# Patient Record
Sex: FEMALE | Race: WHITE | HISPANIC OR LATINO
[De-identification: names, ages, dates, MRNs, and addresses within clinical notes are randomized per-mention and may not be internally consistent; named-entity substitution may affect disease eponyms.]

---

## 2021-12-18 ENCOUNTER — FORM ENCOUNTER (OUTPATIENT)
Age: 61
End: 2021-12-18

## 2021-12-19 ENCOUNTER — FORM ENCOUNTER (OUTPATIENT)
Age: 61
End: 2021-12-19

## 2021-12-20 ENCOUNTER — FORM ENCOUNTER (OUTPATIENT)
Age: 61
End: 2021-12-20

## 2021-12-29 ENCOUNTER — FORM ENCOUNTER (OUTPATIENT)
Age: 61
End: 2021-12-29

## 2021-12-29 PROBLEM — Z00.00 ENCOUNTER FOR PREVENTIVE HEALTH EXAMINATION: Status: ACTIVE | Noted: 2021-12-29

## 2021-12-30 ENCOUNTER — FORM ENCOUNTER (OUTPATIENT)
Age: 61
End: 2021-12-30

## 2022-01-03 ENCOUNTER — NON-APPOINTMENT (OUTPATIENT)
Age: 62
End: 2022-01-03

## 2022-01-05 ENCOUNTER — APPOINTMENT (OUTPATIENT)
Dept: NEUROLOGY | Facility: CLINIC | Age: 62
End: 2022-01-05
Payer: COMMERCIAL

## 2022-01-05 ENCOUNTER — RESULT REVIEW (OUTPATIENT)
Age: 62
End: 2022-01-05

## 2022-01-05 PROCEDURE — 99204 OFFICE O/P NEW MOD 45 MIN: CPT | Mod: 95

## 2022-01-05 NOTE — PHYSICAL EXAM
[FreeTextEntry1] : The patient is alert and oriented x3, naming intact x3, repetition normal, follows three-step commands, and is able to participate fully in the history taking.\par Speech is normal with no evidence of dysarthria.\par \par Memory is intact: Immediate recall 3 out of 3, short-term 3 out of 3, remote memory intact\par \par Cranial nerves II through XII - appear grossly intact \par \par Motor exam: Upper and lower extremities grossly normal power, No abnormal movements noted.\par \par Coordination and vestibular exam: Finger to nose intact, no evidence of truncal or appendicular ataxia. No evidence of nystagmus. \par \par Gait: Normal stance and gait.\par \par Pulmonary: no respiratory distress, normal respiratory rhythm and effort and no accessory muscle use. \par \par Skin: normal skin color and pigmentation, no rash and no skin lesions.\par

## 2022-01-05 NOTE — REASON FOR VISIT
[Initial Evaluation] : an initial evaluation [Home] : at home, [unfilled] , at the time of the visit. [Other Location: e.g. Home (Enter Location, City,State)___] : at [unfilled] [Spouse] : spouse [Verbal consent obtained from patient] : the patient, [unfilled]

## 2022-01-05 NOTE — ASSESSMENT
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 MRI showed punctate hyperintensity in the R hypocampus which is classic of TGA, although small ischemic infarct can not be ruled out. Pt also found to have moderate stenosis of her L ICA on carotid US. Need to repeat MRI which should be done approx 4-6 weeks after initial MRI- this can help confirm etiology of this episode- hyperintensity should disappear on repeat imaging if this was indeed TGA. \par \par (There are few case reports of TGAs which occur as early manifestations of acute MI (pathophysiology thought to be either 1) myocardial ischemia-> leukocyte degranulation and plugging-> cerebral ischemia, vs 2) myocardial ischemia-> increased sympathetic activity -> upregulation of clotting factors inducing a thrombogenic state-> disruption of atherosclerotic plaque stability (including carotids)-> temporary cerebral ischemia)... ) Therefore addressing her CV risk factors is of utmost importance here. I stressed the necessity of close cardiology followup for monitoring of her cardiac status, and advised pt to again discuss cardiac angio with her cardiologist. Will also need to better evaluate plaque burden and stability in her carotids, as this is potentially r/t underlying pathophysiology of her episode. Need to also consider complex partial seizure, although given MRI finding my suspicion for this is low. . \par \par Plan:\par Repeat MRI head w/o\par MRA neck \par Agree with ASA and high dose statin. \par Routine EEG \par Advised pt to send in records from her hospitalization- discs and all paperwork, for review. \par \par Pt to f/u in office with attg, after repeat MRI. \par \par

## 2022-01-05 NOTE — HISTORY OF PRESENT ILLNESS
[FreeTextEntry1] : \par Discussed with patient the use of TEB and that while it can provide safe care in a remote setting, there are technical issues that do arise. I explained that this is a billable encounter. She understands that I cannot physically examine her, which would require an in-office visit. She consents to the use of TEB. She is accompanied today by her  who is helping to provide history. \par \par Pt is a 62yo F with PMH hypothyroidism, recent diagnosis of hypertension on losartan, B12 deficiency on monthly injections, here to establish neurological care after possible CVA vs TGA episode. On December 19th, she was in her usual state of health when she got into an argument with her . She went to separate room to calm herself and then  says she came back to him and was very confused- she was asking what day of the week it was, what year it is, etc. She saw picture of her daughter-in-law who is pregnant and she did not recall that she was pregnant. She kept saying "something feels wrong, I don't feel well." and she became emotional.  says she was aware of her surroundings, knew where to go find her coat., and fully recognized him. He says she remained foggy throughout the rest of the day but improved over the next 12 hours. Pt has very little memory of that day from start of her symptoms, and was totally back to baseline within 36 hours. Pt says that prior to this event, she was feeling chest pressures and "flutters." She continued to experience this while in the hospital- \par \par She went to ER at [facility]- CT head was negative for any acute infarct/ bleed. Her MRI showed punctate focus of restricted diffusion in the R hypocampus suggestive of TGA vs small acute infarct. Carotid ultrasound revealed LICA increased velocity d/t mo 50-69% stenosis. MRA head showed no branch occlusion or high grade stenosis. No MRA neck was obtained. Of note, she had elevated troponins in the ER, and they continued to rise slowly throughout her hospital stay. She had no EKG changes, and no arrhythmias were found on her cardiac monitor. She had TTE during her hospital stay which was normal except for mild mitral regurg. . She was d/c home on ASA 81mg daily, Atorvastatin 80mg QHS, as well as metoprolol 12.5mg QHS for cardioprotection. \par \par She says she has been doing well since d/c. She has had no new neurological events including any AMS, vision changes, focal/ unilateral weakness/ paresthesias/ imbalance/ dizziness, and has felt that she is back to her normal baseline. She is tolerating her medications well- ASA with no bleeding concerns. She has been taking her BPs and they are WNL. She is currently on a 30day cardiac monitor with her cardiologist, and does not believe any arrhythmias have been found thus far. She recently had stress test which was normal. She has been following closely with her cardiologist who is monitoring hger and is considering cardiac cath (but supposedly does not believe her trops were elevated enough to warrant one right now).

## 2022-01-16 ENCOUNTER — RESULT REVIEW (OUTPATIENT)
Age: 62
End: 2022-01-16

## 2022-01-16 ENCOUNTER — APPOINTMENT (OUTPATIENT)
Dept: MRI IMAGING | Facility: HOSPITAL | Age: 62
End: 2022-01-16

## 2022-01-16 ENCOUNTER — OUTPATIENT (OUTPATIENT)
Dept: OUTPATIENT SERVICES | Facility: HOSPITAL | Age: 62
LOS: 1 days | End: 2022-01-16
Payer: COMMERCIAL

## 2022-01-16 PROCEDURE — 70547 MR ANGIOGRAPHY NECK W/O DYE: CPT | Mod: 26

## 2022-01-16 PROCEDURE — 70547 MR ANGIOGRAPHY NECK W/O DYE: CPT

## 2022-01-16 PROCEDURE — 70551 MRI BRAIN STEM W/O DYE: CPT | Mod: 26

## 2022-01-16 PROCEDURE — 70551 MRI BRAIN STEM W/O DYE: CPT

## 2022-01-19 ENCOUNTER — NON-APPOINTMENT (OUTPATIENT)
Age: 62
End: 2022-01-19

## 2022-01-28 ENCOUNTER — APPOINTMENT (OUTPATIENT)
Dept: NEUROLOGY | Facility: CLINIC | Age: 62
End: 2022-01-28
Payer: COMMERCIAL

## 2022-01-28 VITALS
WEIGHT: 117 LBS | SYSTOLIC BLOOD PRESSURE: 142 MMHG | HEART RATE: 86 BPM | TEMPERATURE: 98.6 F | BODY MASS INDEX: 21.53 KG/M2 | HEIGHT: 62 IN | OXYGEN SATURATION: 99 % | DIASTOLIC BLOOD PRESSURE: 98 MMHG

## 2022-01-28 PROCEDURE — 99215 OFFICE O/P EST HI 40 MIN: CPT

## 2022-01-28 NOTE — PHYSICAL EXAM
[FreeTextEntry1] : Alert MMSE: 30/30.Speech and language are intact.  Cranial nerves II-XII are intact.  Motor exam reveals intact strength with individual muscle testing in bilateral upper and lower extremities.  Tone is normal.  Reflexes are normal.  Toes are downgoing.  Sensation is intact to light touch in distal extremities.  Finger-to-nose and heel-to-shin are intact.  Rapid alternating movements are normal in the upper and lower extremities.  Gait is normal. She is able to walk on heels, toes, and in tandem without difficulty. Romberg negative.\par

## 2022-01-28 NOTE — HISTORY OF PRESENT ILLNESS
[FreeTextEntry1] : The patient is a pleasant 61-year-old female with a history of hypothyroidism, hypertension, B12 deficiency and recent transient global amnesia in mid December.  The episode occurred after an argument with her .  During the episode, she was disoriented to time and could not recall retrograde events.  She also repeatedly asked the same question with seemingly no ability to form new memories.  She had improved within 24 to 36 hours.  She was admitted to an outside hospital.  MRI showed a small area of restricted diffusion in the right hippocampus.  Vessel imaging was unremarkable.  Due to elevated troponins, she has also been undergoing cardiac eval and sees cardiology next week. She was started on aspirin 81 mg and atorvastatin 80 mg for possible stroke.  She recently underwent repeat MRI which showed no right hippocampal lesion.  \par \par Since her episode, she still feels off. She is more easily overwhelmed with even small tasks. She has a harder time multitasking. She has had no recurrent episodes of amnesia but does have episodes where she becomes significantly anxious that last only seconds.

## 2022-01-28 NOTE — ASSESSMENT
[FreeTextEntry1] : The patient is a pleasant 61-year-old female with a history of hypothyroidism, hypertension, B12 deficiency and an episode very suggestive of transient global amnesia in mid December.  Repeat MRI showed no hippocampal lesion which further supports the diagnosis. That being said, I agree with EEG as ordered by GEOVANI Byrd. Reassurance provided. Diagnosis, natural history, and pathophysiology (to the best of our knowledge) were discussed with the patient and her . Mutliple questions answered.  Due to the patient's persistent "fog" , neuropsych testing ordered which can be canceled if no longer needed at the time of f/u in 2 months.

## 2022-03-07 ENCOUNTER — APPOINTMENT (OUTPATIENT)
Dept: NEUROLOGY | Facility: CLINIC | Age: 62
End: 2022-03-07
Payer: COMMERCIAL

## 2022-03-07 PROCEDURE — 95819 EEG AWAKE AND ASLEEP: CPT

## 2022-03-08 ENCOUNTER — APPOINTMENT (OUTPATIENT)
Dept: NEUROLOGY | Facility: CLINIC | Age: 62
End: 2022-03-08

## 2022-03-08 PROCEDURE — 95719 EEG PHYS/QHP EA INCR W/O VID: CPT

## 2022-03-08 PROCEDURE — 95700 EEG CONT REC W/VID EEG TECH: CPT

## 2022-03-08 PROCEDURE — 95708 EEG WO VID EA 12-26HR UNMNTR: CPT

## 2022-03-10 ENCOUNTER — NON-APPOINTMENT (OUTPATIENT)
Age: 62
End: 2022-03-10

## 2022-04-07 ENCOUNTER — APPOINTMENT (OUTPATIENT)
Dept: NEUROLOGY | Facility: CLINIC | Age: 62
End: 2022-04-07
Payer: COMMERCIAL

## 2022-04-07 VITALS
SYSTOLIC BLOOD PRESSURE: 120 MMHG | HEIGHT: 62 IN | HEART RATE: 65 BPM | BODY MASS INDEX: 21.9 KG/M2 | WEIGHT: 119 LBS | OXYGEN SATURATION: 99 % | TEMPERATURE: 97.7 F | DIASTOLIC BLOOD PRESSURE: 83 MMHG

## 2022-04-07 PROCEDURE — 99214 OFFICE O/P EST MOD 30 MIN: CPT

## 2022-04-07 NOTE — ASSESSMENT
[FreeTextEntry1] : The patient is a pleasant 61-year-old female with a history of hypothyroidism, hypertension, B12 deficiency and an episode very suggestive of transient global amnesia in mid December. She is doing well. We will plan for another f/u visit in 3 months. If she is still not at baseline at that time, she will have neuropysch testing (currently scheduled for September).

## 2022-04-07 NOTE — HISTORY OF PRESENT ILLNESS
[FreeTextEntry1] : The patient is a pleasant 61-year-old female with a history of hypothyroidism, hypertension, B12 deficiency and an episode very suggestive of transient global amnesia in mid December. Workup for alternative causes with MRI, EEG were unremarkable. She is here for follow-up.\par \par Since our last visit, the patient states her cognitive "fog" has improved significantly. She feels about 90% of her prior baseline. She still feels she has some short-term memory issues, particularly when she is overwhelmed. Her  thinks she is entirely back to her normal self. She has no other new symptoms.

## 2022-07-21 ENCOUNTER — APPOINTMENT (OUTPATIENT)
Dept: NEUROLOGY | Facility: CLINIC | Age: 62
End: 2022-07-21

## 2022-09-22 ENCOUNTER — APPOINTMENT (OUTPATIENT)
Dept: NEUROLOGY | Facility: CLINIC | Age: 62
End: 2022-09-22

## 2022-09-22 PROCEDURE — 96138 PSYCL/NRPSYC TECH 1ST: CPT

## 2022-09-22 PROCEDURE — 96132 NRPSYC TST EVAL PHYS/QHP 1ST: CPT

## 2022-09-22 PROCEDURE — 96139 PSYCL/NRPSYC TST TECH EA: CPT

## 2022-09-22 PROCEDURE — 96133 NRPSYC TST EVAL PHYS/QHP EA: CPT

## 2022-09-22 PROCEDURE — 96116 NUBHVL XM PHYS/QHP 1ST HR: CPT

## 2022-10-05 ENCOUNTER — APPOINTMENT (OUTPATIENT)
Dept: NEUROLOGY | Facility: CLINIC | Age: 62
End: 2022-10-05

## 2022-10-05 VITALS
WEIGHT: 119 LBS | TEMPERATURE: 98.2 F | HEART RATE: 72 BPM | DIASTOLIC BLOOD PRESSURE: 86 MMHG | SYSTOLIC BLOOD PRESSURE: 153 MMHG | HEIGHT: 62 IN | BODY MASS INDEX: 21.9 KG/M2 | OXYGEN SATURATION: 98 %

## 2022-10-05 DIAGNOSIS — R41.89 OTHER SYMPTOMS AND SIGNS INVOLVING COGNITIVE FUNCTIONS AND AWARENESS: ICD-10-CM

## 2022-10-05 DIAGNOSIS — R41.3 OTHER AMNESIA: ICD-10-CM

## 2022-10-05 PROCEDURE — 99212 OFFICE O/P EST SF 10 MIN: CPT

## 2022-10-07 PROBLEM — R41.89 SUBJECTIVE MEMORY COMPLAINTS: Status: ACTIVE | Noted: 2022-01-28

## 2022-10-07 PROBLEM — R41.3 TRANSIENT AMNESIA: Status: ACTIVE | Noted: 2022-01-05

## 2022-10-07 NOTE — ASSESSMENT
[FreeTextEntry1] : The patient is a pleasant 61-year-old female with a history of hypothyroidism, hypertension, B12 deficiency and an episode very suggestive of transient global amnesia in mid December 2021. Workup for alternative causes with MRI, EEG were unremarkable. She is here for follow-up to review neuro-psych results which have no yet resulted. Will have video visit to go over when results are available.

## 2022-10-07 NOTE — HISTORY OF PRESENT ILLNESS
[FreeTextEntry1] : The patient is a pleasant 61-year-old female with a history of hypothyroidism, hypertension, B12 deficiency and an episode very suggestive of transient global amnesia in mid December. Workup for alternative causes with MRI, EEG were unremarkable. She is here for follow-up.\par \par Since our last visit, the patient had neuropsych testing; unfortunately, I do not have the results. She still feels some degree of cognitive fog. She feels is more easily overwhelmed but this has improved some but not resolved. Memory is intact. Her son and  feel she is back to baseline. Her aunt, whom she is very close with, has noted her "slowing down' when in conversation, at times has word-finding difficulties. Stressors are the same- her mother's health is failing. \par \par Otherwise, she has had R hand numbness. She has seen an orthopedic provider who is treating her shoulder (L) pain who also did MRI C spine and believes R hand numbness is related to cervical disease. She is in PT.